# Patient Record
(demographics unavailable — no encounter records)

---

## 2025-05-02 NOTE — DISCUSSION/SUMMARY
[de-identified] : ASSESSMENT Discussed findings of today's exam and possible cause of patient's pain.  Educated patient on their most probable diagnosis of osteoarthritis of the Left and right Knee. Explained that osteoarthritis is a degenerative disease of the knee that causes progressive loss of articular cartilage. Risk factors for OA include age, joint iry, obesity, genetics, anatomical factors including joint shape and alignment, and sex.  Reviewed possible courses of treatment including nonoperative treatment modalities such as weight reduction, activity modification with low impact exercise, as needed use of acetaminophen or anti-inflammatory medication if tolerated, glucosamine/chondroitin supplements, and physical therapy.  Further treatment could include corticosteroid injections, hyaluronic acid injections and orthobiologics such as PRP.  She had recent travel in a foreign country with some warmth of the left knee joint and in addition to a significant effusion, like to rule out any other underlying causes of her left knee pain and swelling at this time.  I did state to the patient this is likely all related to an exacerbation of osteoarthritis.  We collaboratively decided best course of treatment at this time includes the following:  PLAN 1. Procedures: Ultrasound-guided aspiration of the left knee joint was performed today.  See procedure note.  Send out a synovial fluid lab analysis consisting of cell count, culture, Gram stain, crystals. 2. Physical Therapy: HEP of the Knee was provided to the patient in addition to a formal PT prescription to focus on core, hip, knee range of motion, quad strengthening, balance training 3. Medications: NSAIDs as needed 4. Imaging: XR reviewed with patient. See imaging. 5. Labs: Ordered an ESR, CRP, CBC, CMP 6. Orthopedic Supplies: Knee brace discussed but deferred at this time 7. Restrictions: Patient was counseled regarding activity/activity modification.  Activities as tolerated, avoiding any painful activities with only slow gradual advances as tolerated and once ready.  Advised to pay close attention on whether there is any pain during and/or after the activity, in which case if this occurs, the patient should back off on the activity. 8. Referrals: None 9. Follow-up: After lab results.  As long as there is no evidence of infection which there is a very low suspicion for at this time, I will have the patient follow-up in office in 1 week and where we can perform a corticosteroid injection.  Patient appreciates and agrees with current plan.  This note was generated using Dragon medical dictation software.  A reasonable effort has been made for proofreading its contents, but typos may still remain.  If there are any questions or points of clarification needed, please notify my office.  Eddie Diaz MD, CAM

## 2025-05-02 NOTE — PHYSICAL EXAM
[de-identified] : (LEFT) KNEE EXAM  INSPECTION Appearance: No erythema, gross deformities or malalignment Effusion: Moderate to severe Bursa swelling: None  PALPATION Medial joint line: Positive Lateral joint line: None Medial retinaculum: None Lateral retinaculum: None Medial Tibial Plateau: None Lateral Tibial Plateau: None Medial Femoral Condyle: None Lateral Femoral Condyle: None Tibial Tuberosity: None MCL: None LCL: None Patellar tendon: None Quadriceps tendon: None ITB at lateral femoral condyle: None ITB at Gerdy's tubercle: None Fibular head: None Pes anserine: None  Crepitus: None Defect: None Popliteal fullness: Negative  ROM Active Flexion: 0 to 110 degrees Passive Flexion: 0 to 120 degrees Extension: Full SLR (assessing quad/patella tendon function)-able to perform  MOTOR STRENGTH Flexion: 5/5  Extension: 5/5   SENSORY INDEX Normal  PATELLOFEMORAL Patellar grind test: Positive Patellar apprehension: Negative J-sign: Negative Double leg squat: Able to perform Single leg-squat: Able to perform  ACL/PCL Lachman test: Stable Anterior drawer: Stable Posterior drawer: Stable Dial Test: Stable  MCL/LCL   MCL Valgus laxity: Stable LCL Varus laxity: Stable  MENISCUS Sulaiman's (positive for pain, snapping, audible clicking, locking) Medial Meniscus (full knee flexion->ER tibia->Valgus): Negative Lateral Meniscus (full knee flexion->IR tibia->Varus): Negative Thessalys: Negative  IT Band tests Malacrae's: Negative Jad's: Negative  RIGHT KNEE EXAM APPEARANCE: No erythema, marked deformities, effusion or malalignment POSITIVE TENDERNESS: Default NONTENDER: jt lines b/l, patellar & quadriceps tendons, med/lat tibial plateau's and femoral condyles, MCL/LCL, ITB at the lateral femoral condyle & Gerdy's tubercle, fibular head, pes bursa. ROM: Full with active and passive flexion and extension RESISTIVE TESTING: painless knee flexion/knee extension SPECIAL TESTS: stable v/v stress. painless grind/apprehension/squat, neg Lachman's. neg ant/post drawer. neg Sulaiman's/Thessaly's, Malacrae's/Jad's   [de-identified] : XR of Date: 5/1/2025 Views: 3-Weightbearing AP, Lateral, [place] Performed at [facility]: Yes  Impression: 3 views of the left knee were obtained today that show no fracture, or dislocation.  Mild joint space narrowing of the medial compartment and mild patellofemoral joint space narrowing with osteophytosis.  Also evidence of patella enthesiopathy.  3 views of the right knee were obtained today that show no fracture, or dislocation.  Mild medial joint space narrowing with mild patellofemoral joint space narrowing with osteophytosis and patella enthesiopathy.  The radiographs discussed were ordered and read by me during this patient's visit.  I reviewed each radiograph detail with the patient discussed the findings highlighted in the Impression.

## 2025-05-02 NOTE — DISCUSSION/SUMMARY
[de-identified] : ASSESSMENT Discussed findings of today's exam and possible cause of patient's pain.  Educated patient on their most probable diagnosis of osteoarthritis of the Left and right Knee. Explained that osteoarthritis is a degenerative disease of the knee that causes progressive loss of articular cartilage. Risk factors for OA include age, joint iry, obesity, genetics, anatomical factors including joint shape and alignment, and sex.  Reviewed possible courses of treatment including nonoperative treatment modalities such as weight reduction, activity modification with low impact exercise, as needed use of acetaminophen or anti-inflammatory medication if tolerated, glucosamine/chondroitin supplements, and physical therapy.  Further treatment could include corticosteroid injections, hyaluronic acid injections and orthobiologics such as PRP.  She had recent travel in a foreign country with some warmth of the left knee joint and in addition to a significant effusion, like to rule out any other underlying causes of her left knee pain and swelling at this time.  I did state to the patient this is likely all related to an exacerbation of osteoarthritis.  We collaboratively decided best course of treatment at this time includes the following:  PLAN 1. Procedures: Ultrasound-guided aspiration of the left knee joint was performed today.  See procedure note.  Send out a synovial fluid lab analysis consisting of cell count, culture, Gram stain, crystals. 2. Physical Therapy: HEP of the Knee was provided to the patient in addition to a formal PT prescription to focus on core, hip, knee range of motion, quad strengthening, balance training 3. Medications: NSAIDs as needed 4. Imaging: XR reviewed with patient. See imaging. 5. Labs: Ordered an ESR, CRP, CBC, CMP 6. Orthopedic Supplies: Knee brace discussed but deferred at this time 7. Restrictions: Patient was counseled regarding activity/activity modification.  Activities as tolerated, avoiding any painful activities with only slow gradual advances as tolerated and once ready.  Advised to pay close attention on whether there is any pain during and/or after the activity, in which case if this occurs, the patient should back off on the activity. 8. Referrals: None 9. Follow-up: After lab results.  As long as there is no evidence of infection which there is a very low suspicion for at this time, I will have the patient follow-up in office in 1 week and where we can perform a corticosteroid injection.  Patient appreciates and agrees with current plan.  This note was generated using Dragon medical dictation software.  A reasonable effort has been made for proofreading its contents, but typos may still remain.  If there are any questions or points of clarification needed, please notify my office.  Eddie Diaz MD, CAM

## 2025-05-02 NOTE — PROCEDURE
[de-identified] : Indication: Effusion of the left knee Joint  Aspiration: Ultrasound-guided Aspiration of left Knee Joint  Pre-Procedure: A discussion was had with the patient regarding this procedure and all questions were answered.  All risks, benefits, and alternatives were discussed.  These included but were not limited to bleeding, infection, allergic reaction, and reaccumulation of fluid.  Procedure: A timeout was performed prior to the procedure to ensure proper side.  Chlorhexidine and Betadine were used to clean, sterilize, and prep the area in the superolateral aspect of the knee. Ensured placement within the intra-articular left knee joint utilizing the [employer] ultrasound machine, the Linear 6 cm 15-6 MHz transducer, sterile probe cover, and sterile ultrasound gel. Ethyl chloride spray was then used as a topical anesthetic. Ultrasound guidance with the probe in short axis to the joint, utilizing an in-plane approach was used. A 25-gauge needle was used to inject 3 cc's of 1% lidocaine without epinephrine for local anesthesia in the superolateral aspect of the knee. A 18-gauge needle was used to aspirate the knee joint from the superolateral approach and approximately 20 cc's of straw-colored fluid was aspirated from the knee without complication.   Post-procedure: A sterile bandage was then applied. The patient tolerated the procedure well, and there were no complications.

## 2025-05-02 NOTE — HISTORY OF PRESENT ILLNESS
[de-identified] : This is a very pleasant 64-year-old female that presents today with left knee pain.  Patient started experiencing this pain around 3 weeks ago when she came back from a trip to [place].  As they landed back in [place], when she rise from a seated position she noted some immediate pain in the left knee and also started appreciated some swelling.  She went to [facility] emergency room on 4/11/2025 where they performed a venous duplex ultrasound which was negative for DVT.  She was advised to follow-up.  Since the emergency room visit, patient states that the pain and swelling has been severe where she is having difficulty walking and ambulating without stairs.  She denies any fevers, chills, rashes.  Denies any recent urinary infections.  She does note that she is currently on an antibiotic for bronchitis regarding her underlying asthma which has been new since she landed.  Denies any chest pain, shortness of breath.

## 2025-05-02 NOTE — PROCEDURE
[de-identified] : Indication: Effusion of the left knee Joint  Aspiration: Ultrasound-guided Aspiration of left Knee Joint  Pre-Procedure: A discussion was had with the patient regarding this procedure and all questions were answered.  All risks, benefits, and alternatives were discussed.  These included but were not limited to bleeding, infection, allergic reaction, and reaccumulation of fluid.  Procedure: A timeout was performed prior to the procedure to ensure proper side.  Chlorhexidine and Betadine were used to clean, sterilize, and prep the area in the superolateral aspect of the knee. Ensured placement within the intra-articular left knee joint utilizing the [employer] ultrasound machine, the Linear 6 cm 15-6 MHz transducer, sterile probe cover, and sterile ultrasound gel. Ethyl chloride spray was then used as a topical anesthetic. Ultrasound guidance with the probe in short axis to the joint, utilizing an in-plane approach was used. A 25-gauge needle was used to inject 3 cc's of 1% lidocaine without epinephrine for local anesthesia in the superolateral aspect of the knee. A 18-gauge needle was used to aspirate the knee joint from the superolateral approach and approximately 20 cc's of straw-colored fluid was aspirated from the knee without complication.   Post-procedure: A sterile bandage was then applied. The patient tolerated the procedure well, and there were no complications.

## 2025-05-02 NOTE — PHYSICAL EXAM
[de-identified] : (LEFT) KNEE EXAM  INSPECTION Appearance: No erythema, gross deformities or malalignment Effusion: Moderate to severe Bursa swelling: None  PALPATION Medial joint line: Positive Lateral joint line: None Medial retinaculum: None Lateral retinaculum: None Medial Tibial Plateau: None Lateral Tibial Plateau: None Medial Femoral Condyle: None Lateral Femoral Condyle: None Tibial Tuberosity: None MCL: None LCL: None Patellar tendon: None Quadriceps tendon: None ITB at lateral femoral condyle: None ITB at Gerdy's tubercle: None Fibular head: None Pes anserine: None  Crepitus: None Defect: None Popliteal fullness: Negative  ROM Active Flexion: 0 to 110 degrees Passive Flexion: 0 to 120 degrees Extension: Full SLR (assessing quad/patella tendon function)-able to perform  MOTOR STRENGTH Flexion: 5/5  Extension: 5/5   SENSORY INDEX Normal  PATELLOFEMORAL Patellar grind test: Positive Patellar apprehension: Negative J-sign: Negative Double leg squat: Able to perform Single leg-squat: Able to perform  ACL/PCL Lachman test: Stable Anterior drawer: Stable Posterior drawer: Stable Dial Test: Stable  MCL/LCL   MCL Valgus laxity: Stable LCL Varus laxity: Stable  MENISCUS Sulaiman's (positive for pain, snapping, audible clicking, locking) Medial Meniscus (full knee flexion->ER tibia->Valgus): Negative Lateral Meniscus (full knee flexion->IR tibia->Varus): Negative Thessalys: Negative  IT Band tests Malacrae's: Negative Jad's: Negative  RIGHT KNEE EXAM APPEARANCE: No erythema, marked deformities, effusion or malalignment POSITIVE TENDERNESS: Default NONTENDER: jt lines b/l, patellar & quadriceps tendons, med/lat tibial plateau's and femoral condyles, MCL/LCL, ITB at the lateral femoral condyle & Gerdy's tubercle, fibular head, pes bursa. ROM: Full with active and passive flexion and extension RESISTIVE TESTING: painless knee flexion/knee extension SPECIAL TESTS: stable v/v stress. painless grind/apprehension/squat, neg Lachman's. neg ant/post drawer. neg Sulaiman's/Thessaly's, Malacrae's/Jad's   [de-identified] : XR of Date: 5/1/2025 Views: 3-Weightbearing AP, Lateral, [place] Performed at [facility]: Yes  Impression: 3 views of the left knee were obtained today that show no fracture, or dislocation.  Mild joint space narrowing of the medial compartment and mild patellofemoral joint space narrowing with osteophytosis.  Also evidence of patella enthesiopathy.  3 views of the right knee were obtained today that show no fracture, or dislocation.  Mild medial joint space narrowing with mild patellofemoral joint space narrowing with osteophytosis and patella enthesiopathy.  The radiographs discussed were ordered and read by me during this patient's visit.  I reviewed each radiograph detail with the patient discussed the findings highlighted in the Impression.

## 2025-05-02 NOTE — HISTORY OF PRESENT ILLNESS
[de-identified] : This is a very pleasant 64-year-old female that presents today with left knee pain.  Patient started experiencing this pain around 3 weeks ago when she came back from a trip to [place].  As they landed back in [place], when she rise from a seated position she noted some immediate pain in the left knee and also started appreciated some swelling.  She went to [facility] emergency room on 4/11/2025 where they performed a venous duplex ultrasound which was negative for DVT.  She was advised to follow-up.  Since the emergency room visit, patient states that the pain and swelling has been severe where she is having difficulty walking and ambulating without stairs.  She denies any fevers, chills, rashes.  Denies any recent urinary infections.  She does note that she is currently on an antibiotic for bronchitis regarding her underlying asthma which has been new since she landed.  Denies any chest pain, shortness of breath.

## 2025-05-08 NOTE — DISCUSSION/SUMMARY
[de-identified] : Discussed findings of today's exam and possible cause of patient's pain. Educated patient on their most probable diagnosis of osteoarthritis of the Left  Knee.  Synovial fluid analysis read and there is no evidence of infection.  She does have some inflammatory markers by way of ESR and CRP.  Low white blood cell count.  Likely secondary to osteoarthritis.  Consider rheumatologic component.  On exam today she had a moderate effusion.  We drained the effusion and injected her left knee with corticosteroid.  See procedure section. We collaboratively decided best course of treatment at this time includes the following:  PLAN 1. Procedures: Ultrasound-guided aspiration of the left knee joint with corticosteroid injection performed today.'s please see procedure note 2. Physical Therapy: HEP of the Knee was provided to the patient in addition to a formal PT prescription to focus on core, hip, knee range of motion, quad strengthening, balance training 3. Medications: Meloxicam 15 mg daily as needed.  Side effect profile discussed in great detail. 4. Imaging: XR reviewed with patient. See imaging.  If no considerable improvement consider MRI of the left knee. 5. Labs: None at this time 6. Orthopedic Supplies: Knee brace discussed but deferred at this time 7. Restrictions: Patient was counseled regarding activity/activity modification. Activities as tolerated, avoiding any painful activities with only slow gradual advances as tolerated and once ready. Advised to pay close attention on whether there is any pain during and/or after the activity, in which case if this occurs, the patient should back off on the activity. 8. Referrals: If no improvement, consider rheumatologic evaluation 9. Follow-up: 6-8 weeks  Patient appreciates and agrees with current plan. This note was generated using Dragon medical dictation software. A reasonable effort has been made for proofreading its contents, but typos may still remain. If there are any questions or points of clarification needed, please notify my office.  Eddie Diaz MD, CAQSM.

## 2025-05-08 NOTE — PHYSICAL EXAM
[de-identified] : (LEFT) KNEE EXAM  INSPECTION Appearance: No erythema, gross deformities or malalignment Effusion: Moderate to severe Bursa swelling: None  PALPATION Medial joint line: Positive Lateral joint line: None Medial retinaculum: None Lateral retinaculum: None Medial Tibial Plateau: None Lateral Tibial Plateau: None Medial Femoral Condyle: None Lateral Femoral Condyle: None Tibial Tuberosity: None MCL: None LCL: None Patellar tendon: None Quadriceps tendon: None ITB at lateral femoral condyle: None ITB at Gerdy's tubercle: None Fibular head: None Pes anserine: None  Crepitus: None Defect: None Popliteal fullness: Negative  ROM Active Flexion: 0 to 110 degrees Passive Flexion: 0 to 120 degrees Extension: Full SLR (assessing quad/patella tendon function)-able to perform  MOTOR STRENGTH Flexion: 5/5 Extension: 5/5  SENSORY INDEX Normal  PATELLOFEMORAL Patellar grind test: Positive Patellar apprehension: Negative J-sign: Negative Double leg squat: Able to perform Single leg-squat: Able to perform  ACL/PCL Lachman test: Stable Anterior drawer: Stable Posterior drawer: Stable Dial Test: Stable  MCL/LCL MCL Valgus laxity: Stable LCL Varus laxity: Stable  MENISCUS Sulaiman's (positive for pain, snapping, audible clicking, locking) Medial Meniscus (full knee flexion->ER tibia->Valgus): Negative Lateral Meniscus (full knee flexion->IR tibia->Varus): Negative Thessalys: Negative  IT Band tests Malacrae's: Negative Jad's: Negative  RIGHT KNEE EXAM APPEARANCE: No erythema, marked deformities, effusion or malalignment POSITIVE TENDERNESS: Default NONTENDER: jt lines b/l, patellar & quadriceps tendons, med/lat tibial plateau's and femoral condyles, MCL/LCL, ITB at the lateral femoral condyle & Gerdy's tubercle, fibular head, pes bursa. ROM: Full with active and passive flexion and extension RESISTIVE TESTING: painless knee flexion/knee extension SPECIAL TESTS: stable v/v stress. painless grind/apprehension/squat, neg Lachman's. neg ant/post drawer. neg Sulaiman's/Thessaly's, Malacrae's/Jad's [de-identified] : XR of Date: 5/1/2025 Views: 3-Weightbearing AP, Lateral, [place] Performed at [facility]: Yes  Impression: 3 views of the left knee were obtained today that show no fracture, or dislocation. Mild joint space narrowing of the medial compartment and mild patellofemoral joint space narrowing with osteophytosis. Also evidence of patella enthesiopathy.  3 views of the right knee were obtained today that show no fracture, or dislocation. Mild medial joint space narrowing with mild patellofemoral joint space narrowing with osteophytosis and patella enthesiopathy.  Labs: CRP-8 ESR-54 THADDEUS-1-80, speckled Body fluid-total nucleated cell count 41 cells, yellow fluid Joint culture-no growth at 5 days.  No polymorphonuclear cells, no organisms seen No crystals seen

## 2025-05-08 NOTE — PROCEDURE
[de-identified] : Indication: Effusion of the left knee Joint  Aspiration: Ultrasound-guided Aspiration of left  Pre-Procedure: A discussion was had with the patient regarding this procedure and all questions were answered.  All risks, benefits, and alternatives were discussed.  These included but were not limited to bleeding, infection, allergic reaction, and reaccumulation of fluid.  Procedure: A timeout was performed prior to the procedure to ensure proper side.  Chlorhexidine and Betadine were used to clean, sterilize, and prep the area in the superolateral aspect of the knee. Ensured placement within the intra-articular left knee joint utilizing the [employer] ultrasound machine, the Linear 6 cm 15-6 MHz transducer, sterile probe cover, and sterile ultrasound gel. Ethyl chloride spray was then used as a topical anesthetic. Ultrasound guidance with the probe in short axis to the joint, utilizing an in-plane approach was used. A 18-gauge needle was used to aspirate the knee joint from the superolateral approach and approximately 20 cc's of yellow straw-colored fluid was aspirated from the knee without complication. In addition, following the aspiration, an injection of 2 cc's of 1% lidocaine without epinephrine and 1 cc of 40mg/ml methylprednisolone was inserted into the knee via the same needle.   Post-procedure: A sterile bandage was then applied. The patient tolerated the procedure well, and there were no complications.

## 2025-05-08 NOTE — HISTORY OF PRESENT ILLNESS
[de-identified] : 5/8/2025: Patient presents in follow-up of left knee pain.  Since last visit we aspirated her knee and sent it out for analysis.  There is no evidence of infection.  She states that the knee is still painful.  5/1/2025: This is a very pleasant 64-year-old female that presents today with left knee pain. Patient started experiencing this pain around 3 weeks ago when she came back from a trip to [place]. As they landed back in [place], when she rise from a seated position she noted some immediate pain in the left knee and also started appreciated some swelling. She went to [facility] emergency room on 4/11/2025 where they performed a venous duplex ultrasound which was negative for DVT. She was advised to follow-up. Since the emergency room visit, patient states that the pain and swelling has been severe where she is having difficulty walking and ambulating without stairs. She denies any fevers, chills, rashes. Denies any recent urinary infections. She does note that she is currently on an antibiotic for bronchitis regarding her underlying asthma which has been new since she landed. Denies any chest pain, shortness of breath.

## 2025-06-26 NOTE — DISCUSSION/SUMMARY
[de-identified] : Discussed findings of today's exam and possible cause of patient's pain. Educated patient on their most probable diagnosis of osteoarthritis of the Left Knee.  Improved with corticosteroid injection on 5/8. Mild effusion on today's examination without significant pain. We collaboratively decided best course of treatment at this time includes the following:  PLAN 1. Procedures: S/p ultrasound-guided aspiration of the left knee joint with corticosteroid injection performed on 5/8.  We will proceed with hyaluronic acid injection.  I explained this has to be approved by the insurance company.  We will submit today. 2. Physical Therapy: HEP of the Knee was provided to the patient in addition to a formal PT prescription to focus on core, hip, knee range of motion, quad strengthening, balance training 3. Medications: Meloxicam 15 mg daily as needed. Side effect profile discussed in great detail. 4. Imaging: None at this time. 5. Labs: None at this time 6. Orthopedic Supplies: Advised to purchase a knee compression sleeve over-the-counter.  She may utilize this with activity/walking. 7. Restrictions: Patient was counseled regarding activity/activity modification. Activities as tolerated, avoiding any painful activities with only slow gradual advances as tolerated and once ready. Advised to pay close attention on whether there is any pain during and/or after the activity, in which case if this occurs, the patient should back off on the activity. 8. Referrals: If no improvement, consider rheumatologic evaluation 9. Follow-up: After HA improved.  Patient appreciates and agrees with current plan. This note was generated using Dragon medical dictation software. A reasonable effort has been made for proofreading its contents, but typos may still remain. If there are any questions or points of clarification needed, please notify my office.  Eddie Diaz MD, CAQSM.

## 2025-06-26 NOTE — HISTORY OF PRESENT ILLNESS
[de-identified] : 6/26/2025: Patient presents today for follow-up of left knee pain.  Since last visit she notes that the pain has improved.  Still notices some mild swelling from time to time.  Has not done formal physical therapy as she is extremely active without pain at this point.  She is here today for further evaluation.  5/8/2025: Patient presents in follow-up of left knee pain. Since last visit we aspirated her knee and sent it out for analysis. There is no evidence of infection. She states that the knee is still painful.  5/1/2025: This is a very pleasant 64-year-old female that presents today with left knee pain. Patient started experiencing this pain around 3 weeks ago when she came back from a trip to [place]. As they landed back in [place], when she rise from a seated position she noted some immediate pain in the left knee and also started appreciated some swelling. She went to [facility] emergency room on 4/11/2025 where they performed a venous duplex ultrasound which was negative for DVT. She was advised to follow-up. Since the emergency room visit, patient states that the pain and swelling has been severe where she is having difficulty walking and ambulating without stairs. She denies any fevers, chills, rashes. Denies any recent urinary infections. She does note that she is currently on an antibiotic for bronchitis regarding her underlying asthma which has been new since she landed. Denies any chest pain, shortness of breath.

## 2025-06-26 NOTE — PHYSICAL EXAM
[de-identified] : (LEFT) KNEE EXAM  INSPECTION Appearance: No erythema, gross deformities or malalignment Effusion: Mild Bursa swelling: None  PALPATION Medial joint line: None Lateral joint line: None Medial retinaculum: None Lateral retinaculum: None Medial Tibial Plateau: None Lateral Tibial Plateau: None Medial Femoral Condyle: None Lateral Femoral Condyle: None Tibial Tuberosity: None MCL: None LCL: None Patellar tendon: None Quadriceps tendon: None ITB at lateral femoral condyle: None ITB at Gerdy's tubercle: None Fibular head: None Pes anserine: None  Crepitus: None Defect: None Popliteal fullness: Negative  ROM Active Flexion: 0 to 110 degrees Passive Flexion: 0 to 120 degrees Extension: Full SLR (assessing quad/patella tendon function)-able to perform  MOTOR STRENGTH Flexion: 5/5 Extension: 5/5  SENSORY INDEX Normal  PATELLOFEMORAL Patellar grind test: Positive Patellar apprehension: Negative J-sign: Negative Double leg squat: Able to perform Single leg-squat: Able to perform  ACL/PCL Lachman test: Stable Anterior drawer: Stable Posterior drawer: Stable Dial Test: Stable  MCL/LCL MCL Valgus laxity: Stable LCL Varus laxity: Stable  MENISCUS Sulaiman's (positive for pain, snapping, audible clicking, locking) Medial Meniscus (full knee flexion->ER tibia->Valgus): Negative Lateral Meniscus (full knee flexion->IR tibia->Varus): Negative Thessalys: Negative  IT Band tests Malacrae's: Negative Jad's: Negative  RIGHT KNEE EXAM APPEARANCE: No erythema, marked deformities, effusion or malalignment POSITIVE TENDERNESS: Default NONTENDER: jt lines b/l, patellar & quadriceps tendons, med/lat tibial plateau's and femoral condyles, MCL/LCL, ITB at the lateral femoral condyle & Gerdy's tubercle, fibular head, pes bursa. ROM: Full with active and passive flexion and extension RESISTIVE TESTING: painless knee flexion/knee extension SPECIAL TESTS: stable v/v stress. painless grind/apprehension/squat, neg Lachman's. neg ant/post drawer. neg Sulaiman's/Thessaly's, Malacrae's/Jad's   [de-identified] : R of Date: 5/1/2025 Views: 3-Weightbearing AP, Lateral, [place] Performed at [facility]: Yes  Impression: 3 views of the left knee were obtained today that show no fracture, or dislocation. Mild joint space narrowing of the medial compartment and mild patellofemoral joint space narrowing with osteophytosis. Also evidence of patella enthesiopathy.  3 views of the right knee were obtained today that show no fracture, or dislocation. Mild medial joint space narrowing with mild patellofemoral joint space narrowing with osteophytosis and patella enthesiopathy.  Labs: CRP-8 ESR-[id number], speckled Body fluid-total nucleated cell count 41 cells, yellow fluid Joint culture-no growth at 5 days. No polymorphonuclear cells, no organisms seen No crystals seen.

## 2025-06-26 NOTE — HISTORY OF PRESENT ILLNESS
[de-identified] : 6/26/2025: Patient presents today for follow-up of left knee pain.  Since last visit she notes that the pain has improved.  Still notices some mild swelling from time to time.  Has not done formal physical therapy as she is extremely active without pain at this point.  She is here today for further evaluation.  5/8/2025: Patient presents in follow-up of left knee pain. Since last visit we aspirated her knee and sent it out for analysis. There is no evidence of infection. She states that the knee is still painful.  5/1/2025: This is a very pleasant 64-year-old female that presents today with left knee pain. Patient started experiencing this pain around 3 weeks ago when she came back from a trip to [place]. As they landed back in [place], when she rise from a seated position she noted some immediate pain in the left knee and also started appreciated some swelling. She went to [facility] emergency room on 4/11/2025 where they performed a venous duplex ultrasound which was negative for DVT. She was advised to follow-up. Since the emergency room visit, patient states that the pain and swelling has been severe where she is having difficulty walking and ambulating without stairs. She denies any fevers, chills, rashes. Denies any recent urinary infections. She does note that she is currently on an antibiotic for bronchitis regarding her underlying asthma which has been new since she landed. Denies any chest pain, shortness of breath.

## 2025-06-26 NOTE — DISCUSSION/SUMMARY
[de-identified] : Discussed findings of today's exam and possible cause of patient's pain. Educated patient on their most probable diagnosis of osteoarthritis of the Left Knee.  Improved with corticosteroid injection on 5/8. Mild effusion on today's examination without significant pain. We collaboratively decided best course of treatment at this time includes the following:  PLAN 1. Procedures: S/p ultrasound-guided aspiration of the left knee joint with corticosteroid injection performed on 5/8.  We will proceed with hyaluronic acid injection.  I explained this has to be approved by the insurance company.  We will submit today. 2. Physical Therapy: HEP of the Knee was provided to the patient in addition to a formal PT prescription to focus on core, hip, knee range of motion, quad strengthening, balance training 3. Medications: Meloxicam 15 mg daily as needed. Side effect profile discussed in great detail. 4. Imaging: None at this time. 5. Labs: None at this time 6. Orthopedic Supplies: Advised to purchase a knee compression sleeve over-the-counter.  She may utilize this with activity/walking. 7. Restrictions: Patient was counseled regarding activity/activity modification. Activities as tolerated, avoiding any painful activities with only slow gradual advances as tolerated and once ready. Advised to pay close attention on whether there is any pain during and/or after the activity, in which case if this occurs, the patient should back off on the activity. 8. Referrals: If no improvement, consider rheumatologic evaluation 9. Follow-up: After HA improved.  Patient appreciates and agrees with current plan. This note was generated using Dragon medical dictation software. A reasonable effort has been made for proofreading its contents, but typos may still remain. If there are any questions or points of clarification needed, please notify my office.  Eddie Diaz MD, CAQSM.

## 2025-06-26 NOTE — PHYSICAL EXAM
[de-identified] : (LEFT) KNEE EXAM  INSPECTION Appearance: No erythema, gross deformities or malalignment Effusion: Mild Bursa swelling: None  PALPATION Medial joint line: None Lateral joint line: None Medial retinaculum: None Lateral retinaculum: None Medial Tibial Plateau: None Lateral Tibial Plateau: None Medial Femoral Condyle: None Lateral Femoral Condyle: None Tibial Tuberosity: None MCL: None LCL: None Patellar tendon: None Quadriceps tendon: None ITB at lateral femoral condyle: None ITB at Gerdy's tubercle: None Fibular head: None Pes anserine: None  Crepitus: None Defect: None Popliteal fullness: Negative  ROM Active Flexion: 0 to 110 degrees Passive Flexion: 0 to 120 degrees Extension: Full SLR (assessing quad/patella tendon function)-able to perform  MOTOR STRENGTH Flexion: 5/5 Extension: 5/5  SENSORY INDEX Normal  PATELLOFEMORAL Patellar grind test: Positive Patellar apprehension: Negative J-sign: Negative Double leg squat: Able to perform Single leg-squat: Able to perform  ACL/PCL Lachman test: Stable Anterior drawer: Stable Posterior drawer: Stable Dial Test: Stable  MCL/LCL MCL Valgus laxity: Stable LCL Varus laxity: Stable  MENISCUS Sulaiman's (positive for pain, snapping, audible clicking, locking) Medial Meniscus (full knee flexion->ER tibia->Valgus): Negative Lateral Meniscus (full knee flexion->IR tibia->Varus): Negative Thessalys: Negative  IT Band tests Malacrae's: Negative Jad's: Negative  RIGHT KNEE EXAM APPEARANCE: No erythema, marked deformities, effusion or malalignment POSITIVE TENDERNESS: Default NONTENDER: jt lines b/l, patellar & quadriceps tendons, med/lat tibial plateau's and femoral condyles, MCL/LCL, ITB at the lateral femoral condyle & Gerdy's tubercle, fibular head, pes bursa. ROM: Full with active and passive flexion and extension RESISTIVE TESTING: painless knee flexion/knee extension SPECIAL TESTS: stable v/v stress. painless grind/apprehension/squat, neg Lachman's. neg ant/post drawer. neg Sulaiman's/Thessaly's, Malacrae's/Jad's   [de-identified] : R of Date: 5/1/2025 Views: 3-Weightbearing AP, Lateral, [place] Performed at [facility]: Yes  Impression: 3 views of the left knee were obtained today that show no fracture, or dislocation. Mild joint space narrowing of the medial compartment and mild patellofemoral joint space narrowing with osteophytosis. Also evidence of patella enthesiopathy.  3 views of the right knee were obtained today that show no fracture, or dislocation. Mild medial joint space narrowing with mild patellofemoral joint space narrowing with osteophytosis and patella enthesiopathy.  Labs: CRP-8 ESR-[id number]-[id number], speckled Body fluid-total nucleated cell count 41 cells, yellow fluid Joint culture-no growth at 5 days. No polymorphonuclear cells, no organisms seen No crystals seen.